# Patient Record
Sex: MALE | Race: WHITE | NOT HISPANIC OR LATINO | Employment: OTHER | URBAN - METROPOLITAN AREA
[De-identification: names, ages, dates, MRNs, and addresses within clinical notes are randomized per-mention and may not be internally consistent; named-entity substitution may affect disease eponyms.]

---

## 2024-08-08 ENCOUNTER — OFFICE VISIT (OUTPATIENT)
Age: 74
End: 2024-08-08
Payer: MEDICARE

## 2024-08-08 VITALS
SYSTOLIC BLOOD PRESSURE: 136 MMHG | OXYGEN SATURATION: 97 % | DIASTOLIC BLOOD PRESSURE: 78 MMHG | RESPIRATION RATE: 18 BRPM | WEIGHT: 229.28 LBS | TEMPERATURE: 97.3 F | BODY MASS INDEX: 30.39 KG/M2 | HEART RATE: 110 BPM | HEIGHT: 73 IN

## 2024-08-08 DIAGNOSIS — H00.021 HORDEOLUM INTERNUM OF RIGHT UPPER EYELID: Primary | ICD-10-CM

## 2024-08-08 PROCEDURE — 99213 OFFICE O/P EST LOW 20 MIN: CPT

## 2024-08-08 PROCEDURE — G0463 HOSPITAL OUTPT CLINIC VISIT: HCPCS

## 2024-08-08 RX ORDER — AMLODIPINE BESYLATE AND BENAZEPRIL HYDROCHLORIDE 5; 20 MG/1; MG/1
1 CAPSULE ORAL 2 TIMES DAILY
COMMUNITY

## 2024-08-08 RX ORDER — SILDENAFIL 100 MG/1
100 TABLET, FILM COATED ORAL
COMMUNITY
Start: 2024-06-10

## 2024-08-08 RX ORDER — GLIMEPIRIDE 2 MG/1
2 TABLET ORAL
COMMUNITY

## 2024-08-08 RX ORDER — NYSTATIN 100000 U/G
1 CREAM TOPICAL 2 TIMES DAILY
COMMUNITY
Start: 2024-06-10 | End: 2025-06-10

## 2024-08-08 RX ORDER — METOPROLOL SUCCINATE 100 MG/1
TABLET, EXTENDED RELEASE ORAL
COMMUNITY
Start: 2024-06-11

## 2024-08-08 RX ORDER — PITAVASTATIN CALCIUM 2.09 MG/1
2 TABLET, FILM COATED ORAL DAILY
COMMUNITY

## 2024-08-08 RX ORDER — INSULIN LISPRO 100 [IU]/ML
INJECTION, SOLUTION INTRAVENOUS; SUBCUTANEOUS
COMMUNITY

## 2024-08-08 RX ORDER — SITAGLIPTIN AND METFORMIN HYDROCHLORIDE 1000; 50 MG/1; MG/1
1 TABLET, FILM COATED ORAL DAILY
COMMUNITY
Start: 2024-04-29 | End: 2025-04-29

## 2024-08-08 RX ORDER — ERYTHROMYCIN 5 MG/G
0.5 OINTMENT OPHTHALMIC
Qty: 3.5 G | Refills: 0 | Status: SHIPPED | OUTPATIENT
Start: 2024-08-08

## 2024-08-08 RX ORDER — ESCITALOPRAM OXALATE 5 MG/1
5 TABLET ORAL DAILY
COMMUNITY
Start: 2024-06-10

## 2024-08-08 RX ORDER — INSULIN LISPRO 100 [IU]/ML
12 INJECTION, SOLUTION INTRAVENOUS; SUBCUTANEOUS
COMMUNITY
Start: 2024-04-29 | End: 2025-04-19

## 2024-08-08 RX ORDER — INSULIN GLARGINE 100 [IU]/ML
80 INJECTION, SOLUTION SUBCUTANEOUS DAILY
COMMUNITY
Start: 2024-04-29

## 2024-08-08 RX ORDER — ICOSAPENT ETHYL 1000 MG/1
CAPSULE ORAL
COMMUNITY
Start: 2024-06-24

## 2024-08-08 RX ORDER — HYDROCHLOROTHIAZIDE 25 MG/1
12.5 TABLET ORAL
COMMUNITY

## 2024-08-08 RX ORDER — FLUTICASONE FUROATE AND VILANTEROL TRIFENATATE 100; 25 UG/1; UG/1
POWDER RESPIRATORY (INHALATION)
COMMUNITY
Start: 2024-08-05

## 2024-08-08 RX ORDER — TRAMADOL HYDROCHLORIDE 50 MG/1
50 TABLET ORAL EVERY 6 HOURS PRN
COMMUNITY
Start: 2024-06-10 | End: 2025-06-10

## 2024-08-08 NOTE — PROGRESS NOTES
[facility]        NAME: Robert Tate is a 74 y.o. male  : 1950    MRN: [id number]  DATE: 2024  TIME: 12:14 PM    Assessment and Plan   Hordeolum internum of right upper eyelid [H00.021]  1. Hordeolum internum of right upper eyelid  erythromycin (ILOTYCIN) ophthalmic ointment            Patient Instructions   Use antibiotic ointment as prescribed - apply to subconjunctival sac as well as external area of upper eyelid of right eye.  Warm compresses may be created by soaking a cloth towel in hot water and then applied with gentle pressure onto the closed eyelid. They may be applied 4 to 5 times per day for 10 to 15 minutes and may be accompanied by gentle massage of the area      Follow up with Primary Care Provider in 3-5 days if not improving.  Proceed to Emergency Department if symptoms worsen.    If tests have been performed at [facility], our office will contact you with results if changes need to be made to the care plan discussed with you at the visit.  [facility]    Chief Complaint     Chief Complaint   Patient presents with   • Eye Problem     Symptoms began yesterday. No OTC medication. It is sore and irritated, not affecting vision. Right eye.         History of Present Illness       Right upper eyelid soreness redness and swelling starting yesterday.  reports no injury to the eye and vision is not affected.  He is concerned that he is to be seeing his grandson later on today and make sure that it is not something that is contagious such as pinkeye that he could pass onto his grandson.    Eye Problem   Pertinent negatives include no fever, itching or weakness.       Review of Systems   Review of Systems   Constitutional:  Negative for fever.   Eyes:  Negative for pain and itching.        Swelling right upper eyelid   Respiratory:  Negative for shortness of breath.    Cardiovascular:  Negative for chest pain.   Neurological:  Negative  for dizziness, weakness, light-headedness and headaches.         Current Medications       Current Outpatient Medications:   •  amLODIPine-benazepril (LOTREL 5-20) 5-20 MG per capsule, 1 capsule 2 (two) times a day, Disp: , Rfl:   •  apixaban (ELIQUIS) 5 mg, Take 5 mg by mouth, Disp: , Rfl:   •  ascorbic acid (VITAMIN C) 1000 MG tablet, Take 1,000 mg by mouth daily, Disp: , Rfl:   •  Breo Ellipta 100-25 MCG/ACT inhaler, , Disp: , Rfl:   •  erythromycin (ILOTYCIN) ophthalmic ointment, Administer 0.5 inches to the right eye every 6 (six) hours while awake For 7 days., Disp: 3.5 g, Rfl: 0  •  escitalopram (LEXAPRO) 5 mg tablet, Take 5 mg by mouth daily, Disp: , Rfl:   •  glimepiride (AMARYL) 2 mg tablet, Take 2 mg by mouth, Disp: , Rfl:   •  hydroCHLOROthiazide 25 mg tablet, Take 12.5 mg by mouth, Disp: , Rfl:   •  Insulin Glargine Solostar (Lantus SoloStar) 100 UNIT/ML SOPN, Inject 80 Units under the skin daily, Disp: , Rfl:   •  insulin lispro (HumaLOG) 100 units/mL injection pen, Inject 12 Units under the skin, Disp: , Rfl:   •  insulin lispro (HumALOG/ADMELOG) 100 units/mL injection, Inject under the skin, Disp: , Rfl:   •  metoprolol succinate (TOPROL-XL) 100 mg 24 hr tablet, , Disp: , Rfl:   •  NexIUM 40 MG capsule, Take 40 mg by mouth, Disp: , Rfl:   •  nystatin (MYCOSTATIN) cream, Apply 1 application. topically 2 (two) times a day, Disp: , Rfl:   •  pitavastatin (LIVALO) 2 mg, Take 2 mg by mouth daily, Disp: , Rfl:   •  sildenafil (VIAGRA) 100 mg tablet, Take 100 mg by mouth, Disp: , Rfl:   •  sitaGLIPtin-metFORMIN (Janumet)  MG per tablet, Take 1 tablet by mouth daily, Disp: , Rfl:   •  traMADol (ULTRAM) 50 mg tablet, Take 50 mg by mouth every 6 (six) hours as needed, Disp: , Rfl:   •  Vascepa 1 g CAPS, , Disp: , Rfl:     Current Allergies     Allergies as of 08/08/2024 - Reviewed 08/08/2024   Allergen Reaction Noted   • Bupropion Hives and Itching 01/14/2013   • Fenofibrate Myalgia 10/17/2013   •  "Pravastatin Myalgia 10/17/2013   • Simvastatin Myalgia 10/17/2013            The following portions of the patient's history were reviewed and updated as appropriate: allergies, current medications, past family history, past medical history, past social history, past surgical history and problem list.     Past Medical History:   Diagnosis Date   • HL (hearing loss)        Past Surgical History:   Procedure Laterality Date   • ORBITAL RECONSTRUCTION         Family History   Problem Relation Age of Onset   • Heart disease Mother    • Diabetes Father          Medications have been verified.        Objective   /78   Pulse (!) 110   Temp (!) 97.3 °F (36.3 °C) (Tympanic)   Resp 18   Ht 6' 1\" (1.854 m)   Wt 104 kg (229 lb 4.5 oz)   SpO2 97%   BMI 30.25 kg/m²   No LMP for male patient.       Physical Exam     Physical Exam  Vitals and nursing note reviewed.   Constitutional:       Appearance: Normal appearance.   HENT:      Head: Normocephalic and atraumatic.   Eyes:      General: Lids are everted, no foreign bodies appreciated.         Right eye: Hordeolum (right upper eyelid) present.     Pulmonary:      Effort: Pulmonary effort is normal.   Skin:     General: Skin is warm and dry.      Capillary Refill: Capillary refill takes less than 2 seconds.   Neurological:      General: No focal deficit present.      Mental Status: He is alert and oriented to person, place, and time. Mental status is at baseline.      Sensory: No sensory deficit.      Motor: No weakness.   Psychiatric:         Mood and Affect: Mood normal.         Behavior: Behavior normal.         Thought Content: Thought content normal.                   "

## 2024-08-08 NOTE — PATIENT INSTRUCTIONS
Use antibiotic ointment as prescribed - apply to subconjunctival sac as well as external area of upper eyelid of right eye.  Warm compresses may be created by soaking a cloth towel in hot water and then applied with gentle pressure onto the closed eyelid. They may be applied 4 to 5 times per day for 10 to 15 minutes and may be accompanied by gentle massage of the area      Follow up with Primary Care Provider in 3-5 days if not improving.  Proceed to Emergency Department if symptoms worsen.    If tests have been performed at [facility], our office will contact you with results if changes need to be made to the care plan discussed with you at the visit.  You can review your full results on [facility].